# Patient Record
Sex: FEMALE | Race: WHITE | NOT HISPANIC OR LATINO | URBAN - METROPOLITAN AREA
[De-identification: names, ages, dates, MRNs, and addresses within clinical notes are randomized per-mention and may not be internally consistent; named-entity substitution may affect disease eponyms.]

---

## 2020-11-03 ENCOUNTER — OFFICE (OUTPATIENT)
Dept: URBAN - METROPOLITAN AREA CLINIC 11 | Facility: CLINIC | Age: 70
End: 2020-11-03

## 2020-11-03 VITALS
OXYGEN SATURATION: 99 % | SYSTOLIC BLOOD PRESSURE: 139 MMHG | HEIGHT: 65 IN | HEART RATE: 65 BPM | WEIGHT: 167 LBS | DIASTOLIC BLOOD PRESSURE: 73 MMHG

## 2020-11-03 DIAGNOSIS — K21.9 GASTRO-ESOPHAGEAL REFLUX DISEASE WITHOUT ESOPHAGITIS: ICD-10-CM

## 2020-11-03 DIAGNOSIS — Z83.71 FAMILY HISTORY OF COLONIC POLYPS: ICD-10-CM

## 2020-11-03 PROCEDURE — 99203 OFFICE O/P NEW LOW 30 MIN: CPT

## 2020-11-03 RX ORDER — SODIUM PICOSULFATE, MAGNESIUM OXIDE, AND ANHYDROUS CITRIC ACID 10; 3.5; 12 MG/160ML; G/160ML; G/160ML
LIQUID ORAL
Qty: 1 | Refills: 0 | Status: ACTIVE
Start: 2020-11-03

## 2020-11-03 NOTE — SERVICEHPINOTES
I am assuming care of this patient from Dr. Gray. Mrs. Kohli is a 71 y/o WF who presents today to discuss colonoscopy. Her last colonoscopy was in 2011 by Dr. Gray with findings of moderate diverticulosis throughout the entire colon, small internal hemorrhoids, and no polyps. Fair prep with liquid stool was noted. No polyps were seen on her colonoscopy in 2006. Since last being seen, her brother and sister were both  diagnosed with benign colon polyps in their 60s. She also has GERD with symptoms well controlled on Prilosec ac supper. In the past she had evening symptoms described as burning into her esophagus that would occur a few hours after dinner. She has noticed symptoms were better with small meals. She is interested in stopping prilosec. She has intentionally lost 10 bs. No warning symptoms. In the past year there is a subtle change in her bowel pattern. She now has a Brunswick type 5 stool (larger soft round balls) instead of Brunswick type 4. She specifically denies Brunswick type 1 stool. She denies hard stool or straining. She has a bowel movement mostly daily. She tries to follow a high fiber diet and drinks sufficient fluids throughout the day as well as water with meals.